# Patient Record
Sex: FEMALE | Race: BLACK OR AFRICAN AMERICAN | NOT HISPANIC OR LATINO
[De-identification: names, ages, dates, MRNs, and addresses within clinical notes are randomized per-mention and may not be internally consistent; named-entity substitution may affect disease eponyms.]

---

## 2017-05-02 ENCOUNTER — RESULT REVIEW (OUTPATIENT)
Age: 32
End: 2017-05-02

## 2018-04-10 ENCOUNTER — EMERGENCY (EMERGENCY)
Facility: HOSPITAL | Age: 33
LOS: 1 days | Discharge: HOME | End: 2018-04-10
Attending: EMERGENCY MEDICINE | Admitting: EMERGENCY MEDICINE
Payer: COMMERCIAL

## 2018-04-10 VITALS
RESPIRATION RATE: 16 BRPM | DIASTOLIC BLOOD PRESSURE: 97 MMHG | OXYGEN SATURATION: 100 % | TEMPERATURE: 98 F | SYSTOLIC BLOOD PRESSURE: 153 MMHG | HEART RATE: 71 BPM

## 2018-04-10 PROCEDURE — 99284 EMERGENCY DEPT VISIT MOD MDM: CPT

## 2018-04-10 RX ORDER — MECLIZINE HCL 12.5 MG
1 TABLET ORAL
Qty: 20 | Refills: 0 | OUTPATIENT
Start: 2018-04-10

## 2018-04-10 NOTE — ED PROVIDER NOTE - PHYSICAL EXAMINATION
Performed Barany test and found pt is no longer dizzy, but states feels generally weak and describes it as "blah"

## 2018-04-10 NOTE — ED PROVIDER NOTE - NSTIMEPROVIDERCAREINITIATE_GEN_ER
MEDICAL ONCOLOGY FOLLOWUP    The electronic health record was reviewed.    The patient was interviewed and evaluated again    Last seen by me 6 mos ago.    HISTORY OF PRESENT ILLNESS:    In summary, the patient is a pleasant 78 year old  female. She remains on adjuvant Anastrozole for H/O breast cancer, Nov 2012 to present. Reference to prior reports.    She RTC in surveillance re-eval.    She reports no new sxs.     And no changes to SBE.     Re H/O osteoporosis, pt has reported non-compliance with Rx Fosamax due to Rx expense. Pt continues Ca and vit D supplementation.     PAST MEDICAL HISTORY, PAST SURGICAL HISTORY, MEDICATIONS, ALLERGIES, FAMILY HISTORY, SOCIAL HISTORY AND REVIEW OF SYSTEMS:  As detailed in the electronic health record with interval updates provided by clinic nursing support staff. Reviewed and agree.    PHYSICAL EXAMINATION:  CONSTITUTIONAL: Alert, cooperative, oriented. Mood and affect appropriate. Appears close to chronological age. Well nourished. Well developed. The patient appearing less fatigued. Estimated performance status of 2.  HEAD: Normocephalic; no scars.  EYES: Conjunctivae and sclerae are clear and without icterus. Pupils are reactive and equal.  ENMT: Sinuses are nontender. No oral exudates, ulcers, masses, thrush or mucositis. Oropharynx clear. Tongue normal.  NECK: Supple without masses or thyromegaly. No jugular venous distension.  HEMATOLOGIC/LYMPHATIC: No petechiae or purpura. No tender or palpable lymph nodes in the cervical, supraclavicular, axillary or inguinal area.  RESPIRATORY: Lungs are clear to auscultation without rhonchi or wheezing.  CARDIOVASCULAR: Regular rate and rhythm of heart without murmurs, gallops or rubs.  CHEST: Chest is symmetric, without chest wall deformities. No palpable breast nor chest wall lesions, and no axillary adenopathy on B manual exam.  ABDOMEN: Non-tender, non-distended, no masses, ascites or hepatosplenomegaly. Good bowel sounds. No  10-Apr-2018 09:02 guarding or rebound tenderness. No pulsatile masses.  BACK/SPINE: No kyphosis, scoliosis, compression fractures. Non-tender to palpation.  MUSCULOSKELETAL: No tenderness or swelling. Normal range of motion without obvious weakness.  EXTREMITIES: No visible deformities. No cyanosis, clubbing or edema. Pulses equal bilaterally.  INTEGUMENTARY: No rashes, scars, or lesions suggestive of malignancy.  NEUROLOGIC: No sensory or motor deficits, normal cerebellar function, cranial nerves intact.  PSYCHIATRIC: Alert and oriented times three. Coherent speech. Verbalizes understanding of our discussions today.    DATA REVIEWED:  Labs and Radiology reviewed.    CBC, CMP reviewed.    Mammo October 2016 reviewed.  IMPRESSION:  No signs of malignancy in either breast. Recommend routine  screening mammogram.    IMPRESSION AND PLAN:  1. Breast cancer. DILCIA. Continue Anastrozole through Nov 2017 to complete plan of 5 years adjuvant therapy. RTC with me in 3-4 mos for re-eval. Interval annual mammo when next due.   2. Osteoporosis. Issues per HPI above. Pt attempting Ca and vit D supplementation and also to continue F/U with Dr. Stark for prior H/O Rx mgmnt with Fosamax.  3. DJD.    Approximately 15 minutes were spent reviewing the patient's records, interviewing and evaluating her, and providing recommendations and care (with assistance of ). Greater than 50% of this time was spent in face-to-face counseling of the patient regarding her oncologic issues, updated exam and next recommendations. Her questions were answered, along with those of her spouse.    Den Otero MD

## 2018-04-10 NOTE — ED PROVIDER NOTE - MEDICAL DECISION MAKING DETAILS
31 y/o F w/ peripheral vertigo since last night that is resolving. Will obtain FS and UCG. If both neg, will give meclizine for sx and note for work

## 2018-04-10 NOTE — ED PROVIDER NOTE - OBJECTIVE STATEMENT
33 y/o F w/ PMHx of HTN (currently on no meds because resolved after pregnancy), presents to the ED c/o room spinning dizziness since last night. Pt notes dizziness occurs every time she closes her eyes and feels like she is falling to the ground. Pt also reports generalized weakness. Denies LOC, pre-syncope, nausea, vomiting, body pain, focal deficits, cough, rhinorrhea, congestion, trauma, similar sx in the past or any other complaints.

## 2018-07-10 ENCOUNTER — EMERGENCY (EMERGENCY)
Facility: HOSPITAL | Age: 33
LOS: 1 days | Discharge: HOME | End: 2018-07-10
Attending: EMERGENCY MEDICINE | Admitting: EMERGENCY MEDICINE
Payer: COMMERCIAL

## 2018-07-10 VITALS
TEMPERATURE: 98 F | SYSTOLIC BLOOD PRESSURE: 156 MMHG | OXYGEN SATURATION: 99 % | RESPIRATION RATE: 17 BRPM | DIASTOLIC BLOOD PRESSURE: 86 MMHG | HEART RATE: 78 BPM

## 2018-07-10 VITALS — WEIGHT: 214.95 LBS

## 2018-07-10 DIAGNOSIS — O00.109 UNSPECIFIED TUBAL PREGNANCY WITHOUT INTRAUTERINE PREGNANCY: ICD-10-CM

## 2018-07-10 LAB
ALBUMIN SERPL ELPH-MCNC: 4.2 G/DL — SIGNIFICANT CHANGE UP (ref 3.3–5)
ALP SERPL-CCNC: 70 U/L — SIGNIFICANT CHANGE UP (ref 40–120)
ALT FLD-CCNC: 10 U/L — SIGNIFICANT CHANGE UP (ref 4–33)
APPEARANCE UR: CLEAR — SIGNIFICANT CHANGE UP
APTT BLD: 30 SEC — SIGNIFICANT CHANGE UP (ref 27.5–37.4)
AST SERPL-CCNC: 16 U/L — SIGNIFICANT CHANGE UP (ref 4–32)
BASOPHILS # BLD AUTO: 0.02 K/UL — SIGNIFICANT CHANGE UP (ref 0–0.2)
BASOPHILS NFR BLD AUTO: 0.3 % — SIGNIFICANT CHANGE UP (ref 0–2)
BILIRUB SERPL-MCNC: 0.2 MG/DL — SIGNIFICANT CHANGE UP (ref 0.2–1.2)
BILIRUB UR-MCNC: NEGATIVE — SIGNIFICANT CHANGE UP
BLD GP AB SCN SERPL QL: NEGATIVE — SIGNIFICANT CHANGE UP
BLOOD UR QL VISUAL: NEGATIVE — SIGNIFICANT CHANGE UP
BUN SERPL-MCNC: 13 MG/DL — SIGNIFICANT CHANGE UP (ref 7–23)
CALCIUM SERPL-MCNC: 9.3 MG/DL — SIGNIFICANT CHANGE UP (ref 8.4–10.5)
CHLORIDE SERPL-SCNC: 101 MMOL/L — SIGNIFICANT CHANGE UP (ref 98–107)
CO2 SERPL-SCNC: 26 MMOL/L — SIGNIFICANT CHANGE UP (ref 22–31)
COLOR SPEC: YELLOW — SIGNIFICANT CHANGE UP
CREAT SERPL-MCNC: 1.18 MG/DL — SIGNIFICANT CHANGE UP (ref 0.5–1.3)
EOSINOPHIL # BLD AUTO: 0.2 K/UL — SIGNIFICANT CHANGE UP (ref 0–0.5)
EOSINOPHIL NFR BLD AUTO: 2.6 % — SIGNIFICANT CHANGE UP (ref 0–6)
GLUCOSE SERPL-MCNC: 93 MG/DL — SIGNIFICANT CHANGE UP (ref 70–99)
GLUCOSE UR-MCNC: NEGATIVE — SIGNIFICANT CHANGE UP
HCG SERPL-ACNC: 1351 MIU/ML — SIGNIFICANT CHANGE UP
HCT VFR BLD CALC: 36.1 % — SIGNIFICANT CHANGE UP (ref 34.5–45)
HGB BLD-MCNC: 12 G/DL — SIGNIFICANT CHANGE UP (ref 11.5–15.5)
IMM GRANULOCYTES # BLD AUTO: 0.02 # — SIGNIFICANT CHANGE UP
IMM GRANULOCYTES NFR BLD AUTO: 0.3 % — SIGNIFICANT CHANGE UP (ref 0–1.5)
INR BLD: 0.92 — SIGNIFICANT CHANGE UP (ref 0.88–1.17)
KETONES UR-MCNC: NEGATIVE — SIGNIFICANT CHANGE UP
LEUKOCYTE ESTERASE UR-ACNC: NEGATIVE — SIGNIFICANT CHANGE UP
LYMPHOCYTES # BLD AUTO: 2.71 K/UL — SIGNIFICANT CHANGE UP (ref 1–3.3)
LYMPHOCYTES # BLD AUTO: 34.7 % — SIGNIFICANT CHANGE UP (ref 13–44)
MCHC RBC-ENTMCNC: 28.9 PG — SIGNIFICANT CHANGE UP (ref 27–34)
MCHC RBC-ENTMCNC: 33.2 % — SIGNIFICANT CHANGE UP (ref 32–36)
MCV RBC AUTO: 87 FL — SIGNIFICANT CHANGE UP (ref 80–100)
MONOCYTES # BLD AUTO: 0.34 K/UL — SIGNIFICANT CHANGE UP (ref 0–0.9)
MONOCYTES NFR BLD AUTO: 4.4 % — SIGNIFICANT CHANGE UP (ref 2–14)
MUCOUS THREADS # UR AUTO: SIGNIFICANT CHANGE UP
NEUTROPHILS # BLD AUTO: 4.51 K/UL — SIGNIFICANT CHANGE UP (ref 1.8–7.4)
NEUTROPHILS NFR BLD AUTO: 57.7 % — SIGNIFICANT CHANGE UP (ref 43–77)
NITRITE UR-MCNC: NEGATIVE — SIGNIFICANT CHANGE UP
NRBC # FLD: 0 — SIGNIFICANT CHANGE UP
PH UR: 5.5 — SIGNIFICANT CHANGE UP (ref 4.6–8)
PLATELET # BLD AUTO: 209 K/UL — SIGNIFICANT CHANGE UP (ref 150–400)
PMV BLD: 9.7 FL — SIGNIFICANT CHANGE UP (ref 7–13)
POTASSIUM SERPL-MCNC: 4 MMOL/L — SIGNIFICANT CHANGE UP (ref 3.5–5.3)
POTASSIUM SERPL-SCNC: 4 MMOL/L — SIGNIFICANT CHANGE UP (ref 3.5–5.3)
PROT SERPL-MCNC: 7.1 G/DL — SIGNIFICANT CHANGE UP (ref 6–8.3)
PROT UR-MCNC: 20 MG/DL — SIGNIFICANT CHANGE UP
PROTHROM AB SERPL-ACNC: 10.6 SEC — SIGNIFICANT CHANGE UP (ref 9.8–13.1)
RBC # BLD: 4.15 M/UL — SIGNIFICANT CHANGE UP (ref 3.8–5.2)
RBC # FLD: 12.1 % — SIGNIFICANT CHANGE UP (ref 10.3–14.5)
RBC CASTS # UR COMP ASSIST: SIGNIFICANT CHANGE UP (ref 0–?)
RH IG SCN BLD-IMP: POSITIVE — SIGNIFICANT CHANGE UP
SODIUM SERPL-SCNC: 140 MMOL/L — SIGNIFICANT CHANGE UP (ref 135–145)
SP GR SPEC: 1.02 — SIGNIFICANT CHANGE UP (ref 1–1.04)
SQUAMOUS # UR AUTO: SIGNIFICANT CHANGE UP
UROBILINOGEN FLD QL: NORMAL MG/DL — SIGNIFICANT CHANGE UP
WBC # BLD: 7.8 K/UL — SIGNIFICANT CHANGE UP (ref 3.8–10.5)
WBC # FLD AUTO: 7.8 K/UL — SIGNIFICANT CHANGE UP (ref 3.8–10.5)
WBC UR QL: SIGNIFICANT CHANGE UP (ref 0–?)

## 2018-07-10 PROCEDURE — 99285 EMERGENCY DEPT VISIT HI MDM: CPT

## 2018-07-10 PROCEDURE — 76830 TRANSVAGINAL US NON-OB: CPT | Mod: 26

## 2018-07-10 RX ORDER — METHOTREXATE 2.5 MG/1
103 TABLET ORAL ONCE
Qty: 0 | Refills: 0 | Status: COMPLETED | OUTPATIENT
Start: 2018-07-10 | End: 2018-07-10

## 2018-07-10 RX ADMIN — METHOTREXATE 103 MILLIGRAM(S): 2.5 TABLET ORAL at 19:03

## 2018-07-10 NOTE — ED PROVIDER NOTE - PROGRESS NOTE DETAILS
JEAN-PIERRE Munguia: Radiology called pt with right adnexal ectopic pregnancy, does not appear to be ruptured, spoke with OBGYN who will see pt. JEAN-PIERRE Munguia: Spoke with OBGYN: pt to get methotrexate and have repeat beta done on day 4 (Friday) and day 7 (Monday). She is to have the labs drawn at the lab affiliated with her OBGYN who saw her in the ED today. She is to see her OB in the office next week, pt agrees to this plan. OB consented patient for methotrexate

## 2018-07-10 NOTE — ED PROVIDER NOTE - MEDICAL DECISION MAKING DETAILS
33yF w/pmhx asthma sent from OB office with concern for ectopic, s/p D&C 2 weeks ago. US this morning showed right ovarian cystic mass. Will repeat US, cbc/cmp, pre-ops. Serum hcg.

## 2018-07-10 NOTE — CONSULT NOTE ADULT - ASSESSMENT
34yo  LMP  2wks postop s/p DVC termination at 6wks GA (), with ultrasound finding of right ovarian mass suspicious for ectopic pregnancy.  Patient is stable and currently asymptomatic    Discussed implications of ectopic pregnancy diagnosis, and recommendations to treat despite lack of definitive diagnosis at this time due to risk of rupture, intra-abdominal bleeding, mortality.

## 2018-07-10 NOTE — ED PROVIDER NOTE - CARE PLAN
Principal Discharge DX:	Ectopic pregnancy, tubal Principal Discharge DX:	Ectopic pregnancy, tubal  Assessment and plan of treatment:	Follow up with your OBGYN next week  Have repeat labs drawn on Friday 7/13 and Monday 7/16 (beta check) at the lab your OB instructed you to use  Return to the emergency department with any worsening or concerning symptoms, pelvic pain, fever/chills, nausea, vomiting or any other concerns.

## 2018-07-10 NOTE — ED PROVIDER NOTE - ATTENDING CONTRIBUTION TO CARE
Pt was seen and evaluated by me. Pt is a 32 y/o female  with PMHx of HTN and recent D&C presenting to the ED for abnormal US. Pt states 2 weeks ago she had a D&C at 6wks. Pt notes she was having similar symptoms to being pregnant with intermittent nausea and tiredness and was being followed up at OB/Gyn today and was found to have an US concerning for an ectopic. Pt denies any fever, chills, vomiting, SOB, chest pain, abd pain, dysuria, vaginal bleeding, or discharge. Lungs CTA b/l. RRR. Abd soft, non-tender.

## 2018-07-10 NOTE — ED PROVIDER NOTE - PLAN OF CARE
Follow up with your OBGYN next week  Have repeat labs drawn on Friday 7/13 and Monday 7/16 (beta check) at the lab your OB instructed you to use  Return to the emergency department with any worsening or concerning symptoms, pelvic pain, fever/chills, nausea, vomiting or any other concerns.

## 2018-07-10 NOTE — CONSULT NOTE ADULT - SUBJECTIVE AND OBJECTIVE BOX
HPI:    33y G_P_, LMP      presents with     SERUM bHCG 1351  OBHx:  GynHx:   PMH:  PSH:  All:  Meds:   SocialHx:     Vital Signs Last 24 Hrs  T(C): 36.7 (10 Jul 2018 13:00), Max: 36.7 (10 Jul 2018 13:00)  T(F): 98 (10 Jul 2018 13:00), Max: 98 (10 Jul 2018 13:00)  HR: 78 (10 Jul 2018 13:00) (78 - 78)  BP: 156/86 (10 Jul 2018 13:00) (156/86 - 156/86)  RR: 17 (10 Jul 2018 13:00) (17 - 17)  SpO2: 99% (10 Jul 2018 13:00) (99% - 99%)    PE:  Gen: Comfortable, NAD  CV: RRR  Pulm: CTAB  Abd: Soft, NT  Ext: No edema or tenderness bilaterally  Spec Exam:    LABS:                        12.0   7.80  )-----------( 209      ( 10 Jul 2018 13:55 )             36.1     07-10    140  |  101  |  13  ----------------------------<  93  4.0   |  26  |  1.18    Ca    9.3      10 Jul 2018 13:55    TPro  7.1  /  Alb  4.2  /  TBili  0.2  /  DBili  x   /  AST  16  /  ALT  10  /  AlkPhos  70  07-10    PT/INR - ( 10 Jul 2018 13:55 )   PT: 10.6 SEC;   INR: 0.92          PTT - ( 10 Jul 2018 13:55 )  PTT:30.0 SEC  Urinalysis Basic - ( 10 Jul 2018 14:00 )    Color: YELLOW / Appearance: CLEAR / S.023 / pH: 5.5  Gluc: NEGATIVE / Ketone: NEGATIVE  / Bili: NEGATIVE / Urobili: NORMAL mg/dL   Blood: NEGATIVE / Protein: 20 mg/dL / Nitrite: NEGATIVE   Leuk Esterase: NEGATIVE / RBC: 0-2 / WBC 0-2   Sq Epi: OCC / Non Sq Epi: x / Bacteria: x        RADIOLOGY & ADDITIONAL STUDIES:  < from: US Transvaginal (07.10.18 @ 15:02) >  FINDINGS:    Uterus: 6.8 x 4 x 5 cm. Within normal limits.    Endometrium: 4 mm. Within normal limits.    Right ovary: 2.3 x 1.2 x 1.1 cm. 2.2 x 1.8 x 2.3 cm mildly hyperechoic   vascular lesion in the right adnexa separate from the ovary.    Left ovary: 2.7 x 2 x 1.6 cm. Within normal limits.    Fluid: Minimal pelvic free fluid.    IMPRESSION:    Suspicious for right adnexal ectopic pregnancy. R3 GYN Consult Note    32yo  LMP  2wks postop s/p DVC termination at 6wks GA (), sent in from [facility] for suspected ectopic pregnancy. Patient reports being referred to [facility] by her GYN, as she desired a termination. She reports ultrasound confirming intrauterine pregnancy prior to the DVC at the clinic. Patient was then asked to follow up next week. She received a call last week asking her to come in urgently, but she was not given a reason. Patient was in [place] on vacation so she scheduled an appointment today instead. Today, ultrasound showed possible R cyst vs ectopic pregnancy and she was sent in.  Patient denies fever, chills, vaginal bleeding, dysuria, lightheadedness or dizziness. She endorses intermittent upper abdominal cramping that occurs late at night, or after meals. She thinks it might be gas, reflux symptoms. She otherwise has no complaints    GYN = Dr. Minaya    [facility] 1351  OBHx:  x3, eTOP x3  GynHx: reg menses, no known f/c/abnl Paps  PMSH: h/o preeclampsia in prior pregnancy, s/p DVC x3  NKDA    Vital Signs Last 24 Hrs  T(C): 36.7 (10 Jul 2018 13:00), Max: 36.7 (10 Jul 2018 13:00)  T(F): 98 (10 Jul 2018 13:00), Max: 98 (10 Jul 2018 13:00)  HR: 78 (10 Jul 2018 13:00) (78 - 78)  BP: 156/86 (10 Jul 2018 13:00) (156/86 - 156/86)  RR: 17 (10 Jul 2018 13:00) (17 - 17)  SpO2: 99% (10 Jul 2018 13:00) (99% - 99%)    PE:  Gen: Comfortable, NAD  CV: RRR  Pulm: CTAB  Abd: soft, nontender, no rebound or guarding  Ext: No edema or tenderness bilaterally  Spec Exam: normal appearing cervix, no bleeding, no discharge  Bimanual: ~6cm anteverted uterus nontender, no CMT, no palpable adnexal masses or tenderness bilaterally    LABS:                        12.0   7.80  )-----------( 209      ( 10 Jul 2018 13:55 )             36.1     07-10    140  |  101  |  13  ----------------------------<  93  4.0   |  26  |  1.18    Ca    9.3      10 Jul 2018 13:55    TPro  7.1  /  Alb  4.2  /  TBili  0.2  /  DBili  x   /  AST  16  /  ALT  10  /  AlkPhos  70  07-10    PT/INR - ( 10 Jul 2018 13:55 )   PT: 10.6 SEC;   INR: 0.92          PTT - ( 10 Jul 2018 13:55 )  PTT:30.0 SEC  Urinalysis Basic - ( 10 Jul 2018 14:00 )    Color: YELLOW / Appearance: CLEAR / S.023 / pH: 5.5  Gluc: NEGATIVE / Ketone: NEGATIVE  / Bili: NEGATIVE / Urobili: NORMAL mg/dL   Blood: NEGATIVE / Protein: 20 mg/dL / Nitrite: NEGATIVE   Leuk Esterase: NEGATIVE / RBC: 0-2 / WBC 0-2   Sq Epi: OCC / Non Sq Epi: x / Bacteria: x        RADIOLOGY & ADDITIONAL STUDIES:  < from: US Transvaginal (07.10.18 @ 15:02) >  FINDINGS:    Uterus: 6.8 x 4 x 5 cm. Within normal limits.    Endometrium: 4 mm. Within normal limits.    Right ovary: 2.3 x 1.2 x 1.1 cm. 2.2 x 1.8 x 2.3 cm mildly hyperechoic   vascular lesion in the right adnexa separate from the ovary.    Left ovary: 2.7 x 2 x 1.6 cm. Within normal limits.    Fluid: Minimal pelvic free fluid.    IMPRESSION:    Suspicious for right adnexal ectopic pregnancy.

## 2018-07-10 NOTE — ED ADULT TRIAGE NOTE - CHIEF COMPLAINT QUOTE
Patient had a termination 2 weeks ago and had went for her follow up and MD saw something in her ovary. Pt sent for evaluation to see if she has an ectopic because she was pregnant with twins.

## 2018-07-10 NOTE — ED ADULT NURSE NOTE - OBJECTIVE STATEMENT
PT had termination ~2 weeks ago, and was a follow-up appointment today.  Pt was sent to ED to R/O ectopic pregnancy.  + right ectopic pregnancy noted on ultrasound in ED.  OBGYN at bedside.

## 2018-07-10 NOTE — CONSULT NOTE ADULT - PROBLEM SELECTOR RECOMMENDATION 9
- pt to receive methotrexate 103mg (50mg/m^2) today  - prescriptions written for serum bHCG level on day 4 (7/13) and day 7 (7/16).   - patient will follow up with [facility] (Dr. Minaya)  - Ectopic precautions reviewed with patient.  Discussed with patient importance of follow up for b-HCG given unknown pregnancy location at this time.  Patient expressed understanding.  All questions and concerns addressed to patient's apparent satisfaction.   - patient to be discharged if remains stable    Pt evaluated with Dr. Minaya  S Blayne, R3

## 2018-07-10 NOTE — CONSULT NOTE ADULT - ATTENDING COMMENTS
Addendum; Advised patient to follow up with serial hcg in the office 7/13/2018 and 7/16/2018 and then weekly until negative.   The patient verbalized understanding. All questions and concern addressed to full satisfaction. Lori

## 2018-07-10 NOTE — ED PROVIDER NOTE - OBJECTIVE STATEMENT
33yF w/pmhx HTN sent from her OB office with concern for ectopic pregnancy. Pt had a D&C performed 2 weeks ago for a 6 week gestation pregnancy, she had a follow up US performed today where the NP saw a right sided ovarian cyst and as pt had a positive pregnancy test today she is concerned for ectopic pregnancy. Pt states she still feels "pregnancy symptoms" of intermittent nausea, vomiting and feeling fatigued. +intermittent lower abdominal cramping. Pt denies fever/chills, abdominal pain at present, diarrhea, cp, shortness of breath, vaginal bleeding, dysuria, recent travel or illness or any other concerns.

## 2018-07-11 LAB
BACTERIA UR CULT: SIGNIFICANT CHANGE UP
SPECIMEN SOURCE: SIGNIFICANT CHANGE UP

## 2018-07-12 RX ORDER — CEPHALEXIN 500 MG
1 CAPSULE ORAL
Qty: 14 | Refills: 0 | OUTPATIENT
Start: 2018-07-12 | End: 2018-07-18

## 2018-07-12 NOTE — ED POST DISCHARGE NOTE - DETAILS
Discussed results with patient.  Pt c/o urinary frequency.  ERX sent to pharmacy for Keflex.  Pt has a follow up appt 7/18/18 with GYN.  Recommended repeat UA/UCX.

## 2018-08-27 ENCOUNTER — RESULT REVIEW (OUTPATIENT)
Age: 33
End: 2018-08-27

## 2018-11-09 ENCOUNTER — EMERGENCY (EMERGENCY)
Facility: HOSPITAL | Age: 33
LOS: 1 days | Discharge: HOME | End: 2018-11-09
Attending: EMERGENCY MEDICINE | Admitting: EMERGENCY MEDICINE
Payer: COMMERCIAL

## 2018-11-09 VITALS
SYSTOLIC BLOOD PRESSURE: 157 MMHG | DIASTOLIC BLOOD PRESSURE: 77 MMHG | OXYGEN SATURATION: 100 % | RESPIRATION RATE: 20 BRPM | TEMPERATURE: 99 F | HEART RATE: 94 BPM

## 2018-11-09 PROCEDURE — 99284 EMERGENCY DEPT VISIT MOD MDM: CPT | Mod: 25

## 2018-11-09 NOTE — ED ADULT TRIAGE NOTE - CHIEF COMPLAINT QUOTE
Pt c/o harsh dry cough, reports having symptoms, saw PCP rx'd Amoxicillin 1wk w/o improvement.  Chest pain w/ coughing. States was having cough w/ yellowish sputum initially but now chest congestion & unable to bring up mucus. Resps unlabored, o2sat 100% on RA.  Appearing in no distress.  EKG obtained, mask applied. Pt c/o harsh dry cough, chest tightness. Saw PCP rx'd Amoxicillin 1wk w/o improvement.  Chest pain w/ cough.  States was producing yellowish sputum initially, now chest congestion & unable to bring up mucus. Resps unlabored, o2sat 100% on RA.  Appearing in no distress.  EKG obtained, mask applied.

## 2018-11-10 VITALS
DIASTOLIC BLOOD PRESSURE: 77 MMHG | SYSTOLIC BLOOD PRESSURE: 142 MMHG | TEMPERATURE: 98 F | OXYGEN SATURATION: 100 % | HEART RATE: 80 BPM | RESPIRATION RATE: 16 BRPM

## 2018-11-10 PROBLEM — I10 ESSENTIAL (PRIMARY) HYPERTENSION: Chronic | Status: ACTIVE | Noted: 2018-04-10

## 2018-11-10 PROCEDURE — 71046 X-RAY EXAM CHEST 2 VIEWS: CPT | Mod: 26

## 2018-11-10 RX ORDER — ALBUTEROL 90 UG/1
1 AEROSOL, METERED ORAL ONCE
Qty: 0 | Refills: 0 | Status: COMPLETED | OUTPATIENT
Start: 2018-11-10 | End: 2018-11-10

## 2018-11-10 RX ADMIN — Medication 100 MILLIGRAM(S): at 04:11

## 2018-11-10 RX ADMIN — ALBUTEROL 1 PUFF(S): 90 AEROSOL, METERED ORAL at 04:11

## 2018-11-10 NOTE — ED PROVIDER NOTE - ATTENDING CONTRIBUTION TO CARE
Dr. Rebollar:  I have personally performed a face to face bedside history and physical examination of this patient. I have discussed the history, examination, review of systems, assessment and plan of management with the resident. I have reviewed the electronic medical record and amended it to reflect my history, review of systems, physical exam, assessment and plan.    33F denies pmh presents with persistent cough x 1.5 weeks.  Was prescribed 7 day course of amoxicillin by her PCP with little improvement.  Tm 101 at home.  ROS otherwise negative, denies sick contacts and recent travel.    Exam:  - nad  - rrr  - ctab   -abd soft ntnd    A/P  - cough, eval PNA  - tessalon perles, albuterol  - CXR

## 2018-11-10 NOTE — ED ADULT NURSE REASSESSMENT NOTE - NS ED NURSE REASSESS COMMENT FT1
returned from break coverage at this time: pt aox4  being d/c at this time.  as per md pt ok to go after clear chest x ray.  given inhaler to be d/c with.  pt verbalized understanding of d/c in no acute distress on leaving.

## 2018-11-10 NOTE — ED PROVIDER NOTE - OBJECTIVE STATEMENT
33F no PMH p/w persistent cough productive of yellowish sputum a/w chest discomfort when coughing x 1.5 weeks. Saw PMD and given 7 day course of amoxicillin without improvement. Also taking OTC cough medication without improvement. No recent travel or sick contacts. Initially had fever (101F) at start of symptoms that has now resolved.

## 2019-05-30 NOTE — ED PROVIDER NOTE - CROS ED RESP ALL NEG
I have faxed this to them.
Spoke with [facility], they need proof that pt had pneumonia shots faxed to them at [phone]
- - -

## 2021-04-15 ENCOUNTER — EMERGENCY (EMERGENCY)
Facility: HOSPITAL | Age: 36
LOS: 1 days | Discharge: HOME | End: 2021-04-15
Admitting: EMERGENCY MEDICINE
Payer: MEDICAID

## 2021-04-15 VITALS
OXYGEN SATURATION: 100 % | RESPIRATION RATE: 18 BRPM | SYSTOLIC BLOOD PRESSURE: 177 MMHG | TEMPERATURE: 98 F | DIASTOLIC BLOOD PRESSURE: 92 MMHG | HEART RATE: 78 BPM

## 2021-04-15 VITALS
HEART RATE: 100 BPM | TEMPERATURE: 98 F | OXYGEN SATURATION: 100 % | HEIGHT: 65 IN | SYSTOLIC BLOOD PRESSURE: 151 MMHG | DIASTOLIC BLOOD PRESSURE: 91 MMHG | RESPIRATION RATE: 18 BRPM

## 2021-04-15 LAB
ALBUMIN SERPL ELPH-MCNC: 4.2 G/DL — SIGNIFICANT CHANGE UP (ref 3.3–5)
ALP SERPL-CCNC: 76 U/L — SIGNIFICANT CHANGE UP (ref 40–120)
ALT FLD-CCNC: 53 U/L — HIGH (ref 4–33)
ANION GAP SERPL CALC-SCNC: 13 MMOL/L — SIGNIFICANT CHANGE UP (ref 7–14)
APPEARANCE UR: CLEAR — SIGNIFICANT CHANGE UP
AST SERPL-CCNC: 62 U/L — HIGH (ref 4–32)
BACTERIA # UR AUTO: NEGATIVE — SIGNIFICANT CHANGE UP
BASOPHILS # BLD AUTO: 0.01 K/UL — SIGNIFICANT CHANGE UP (ref 0–0.2)
BASOPHILS NFR BLD AUTO: 0.2 % — SIGNIFICANT CHANGE UP (ref 0–2)
BILIRUB SERPL-MCNC: 0.3 MG/DL — SIGNIFICANT CHANGE UP (ref 0.2–1.2)
BILIRUB UR-MCNC: NEGATIVE — SIGNIFICANT CHANGE UP
BUN SERPL-MCNC: 12 MG/DL — SIGNIFICANT CHANGE UP (ref 7–23)
CALCIUM SERPL-MCNC: 9.3 MG/DL — SIGNIFICANT CHANGE UP (ref 8.4–10.5)
CHLORIDE SERPL-SCNC: 101 MMOL/L — SIGNIFICANT CHANGE UP (ref 98–107)
CO2 SERPL-SCNC: 27 MMOL/L — SIGNIFICANT CHANGE UP (ref 22–31)
COLOR SPEC: YELLOW — SIGNIFICANT CHANGE UP
CREAT SERPL-MCNC: 0.86 MG/DL — SIGNIFICANT CHANGE UP (ref 0.5–1.3)
DIFF PNL FLD: NEGATIVE — SIGNIFICANT CHANGE UP
EOSINOPHIL # BLD AUTO: 0 K/UL — SIGNIFICANT CHANGE UP (ref 0–0.5)
EOSINOPHIL NFR BLD AUTO: 0 % — SIGNIFICANT CHANGE UP (ref 0–6)
EPI CELLS # UR: 9 /HPF — HIGH (ref 0–5)
GLUCOSE SERPL-MCNC: 96 MG/DL — SIGNIFICANT CHANGE UP (ref 70–99)
GLUCOSE UR QL: NEGATIVE — SIGNIFICANT CHANGE UP
HCT VFR BLD CALC: 42.2 % — SIGNIFICANT CHANGE UP (ref 34.5–45)
HGB BLD-MCNC: 13.7 G/DL — SIGNIFICANT CHANGE UP (ref 11.5–15.5)
HYALINE CASTS # UR AUTO: 21 /LPF — HIGH (ref 0–7)
IANC: 3.14 K/UL — SIGNIFICANT CHANGE UP (ref 1.5–8.5)
IMM GRANULOCYTES NFR BLD AUTO: 0.2 % — SIGNIFICANT CHANGE UP (ref 0–1.5)
KETONES UR-MCNC: ABNORMAL
LEUKOCYTE ESTERASE UR-ACNC: NEGATIVE — SIGNIFICANT CHANGE UP
LIDOCAIN IGE QN: 29 U/L — SIGNIFICANT CHANGE UP (ref 7–60)
LYMPHOCYTES # BLD AUTO: 1.9 K/UL — SIGNIFICANT CHANGE UP (ref 1–3.3)
LYMPHOCYTES # BLD AUTO: 36 % — SIGNIFICANT CHANGE UP (ref 13–44)
MCHC RBC-ENTMCNC: 29 PG — SIGNIFICANT CHANGE UP (ref 27–34)
MCHC RBC-ENTMCNC: 32.5 GM/DL — SIGNIFICANT CHANGE UP (ref 32–36)
MCV RBC AUTO: 89.4 FL — SIGNIFICANT CHANGE UP (ref 80–100)
MONOCYTES # BLD AUTO: 0.22 K/UL — SIGNIFICANT CHANGE UP (ref 0–0.9)
MONOCYTES NFR BLD AUTO: 4.2 % — SIGNIFICANT CHANGE UP (ref 2–14)
NEUTROPHILS # BLD AUTO: 3.14 K/UL — SIGNIFICANT CHANGE UP (ref 1.8–7.4)
NEUTROPHILS NFR BLD AUTO: 59.4 % — SIGNIFICANT CHANGE UP (ref 43–77)
NITRITE UR-MCNC: NEGATIVE — SIGNIFICANT CHANGE UP
NRBC # BLD: 0 /100 WBCS — SIGNIFICANT CHANGE UP
NRBC # FLD: 0 K/UL — SIGNIFICANT CHANGE UP
PH UR: 6.5 — SIGNIFICANT CHANGE UP (ref 5–8)
PLATELET # BLD AUTO: 148 K/UL — LOW (ref 150–400)
POTASSIUM SERPL-MCNC: 3.7 MMOL/L — SIGNIFICANT CHANGE UP (ref 3.5–5.3)
POTASSIUM SERPL-SCNC: 3.7 MMOL/L — SIGNIFICANT CHANGE UP (ref 3.5–5.3)
PROT SERPL-MCNC: 7.8 G/DL — SIGNIFICANT CHANGE UP (ref 6–8.3)
PROT UR-MCNC: ABNORMAL
RBC # BLD: 4.72 M/UL — SIGNIFICANT CHANGE UP (ref 3.8–5.2)
RBC # FLD: 12.3 % — SIGNIFICANT CHANGE UP (ref 10.3–14.5)
RBC CASTS # UR COMP ASSIST: 2 /HPF — SIGNIFICANT CHANGE UP (ref 0–4)
SODIUM SERPL-SCNC: 141 MMOL/L — SIGNIFICANT CHANGE UP (ref 135–145)
SP GR SPEC: 1.03 — HIGH (ref 1.01–1.02)
UROBILINOGEN FLD QL: ABNORMAL
WBC # BLD: 5.28 K/UL — SIGNIFICANT CHANGE UP (ref 3.8–10.5)
WBC # FLD AUTO: 5.28 K/UL — SIGNIFICANT CHANGE UP (ref 3.8–10.5)
WBC UR QL: 10 /HPF — HIGH (ref 0–5)

## 2021-04-15 PROCEDURE — 71045 X-RAY EXAM CHEST 1 VIEW: CPT | Mod: 26

## 2021-04-15 PROCEDURE — 99284 EMERGENCY DEPT VISIT MOD MDM: CPT

## 2021-04-15 RX ORDER — ONDANSETRON 8 MG/1
1 TABLET, FILM COATED ORAL
Qty: 6 | Refills: 0
Start: 2021-04-15 | End: 2021-04-17

## 2021-04-15 RX ORDER — FAMOTIDINE 10 MG/ML
20 INJECTION INTRAVENOUS ONCE
Refills: 0 | Status: COMPLETED | OUTPATIENT
Start: 2021-04-15 | End: 2021-04-15

## 2021-04-15 RX ORDER — FAMOTIDINE 10 MG/ML
1 INJECTION INTRAVENOUS
Qty: 10 | Refills: 0
Start: 2021-04-15 | End: 2021-04-19

## 2021-04-15 RX ORDER — SODIUM CHLORIDE 9 MG/ML
1000 INJECTION INTRAMUSCULAR; INTRAVENOUS; SUBCUTANEOUS ONCE
Refills: 0 | Status: COMPLETED | OUTPATIENT
Start: 2021-04-15 | End: 2021-04-15

## 2021-04-15 RX ORDER — ONDANSETRON 8 MG/1
4 TABLET, FILM COATED ORAL ONCE
Refills: 0 | Status: COMPLETED | OUTPATIENT
Start: 2021-04-15 | End: 2021-04-15

## 2021-04-15 RX ADMIN — SODIUM CHLORIDE 1000 MILLILITER(S): 9 INJECTION INTRAMUSCULAR; INTRAVENOUS; SUBCUTANEOUS at 21:48

## 2021-04-15 RX ADMIN — ONDANSETRON 4 MILLIGRAM(S): 8 TABLET, FILM COATED ORAL at 21:48

## 2021-04-15 RX ADMIN — Medication 30 MILLILITER(S): at 21:47

## 2021-04-15 RX ADMIN — FAMOTIDINE 20 MILLIGRAM(S): 10 INJECTION INTRAVENOUS at 21:47

## 2021-04-15 NOTE — ED ADULT NURSE NOTE - OBJECTIVE STATEMENT
alert oriented no acute distress cooperative c/o low back pain radiating to left leg  left flank pain and epigastric pain  c/o N/V/D abd soft epigastric area tender to touch  no c/o CP dizziness or SOB

## 2021-04-15 NOTE — ED PROVIDER NOTE - NSFOLLOWUPINSTRUCTIONS_ED_ALL_ED_FT
Follow up with your primary care physician in 48-72 hours- bring copies of your results.  Return to the ER for worsening or persistent symptoms, including but not limited to worsening/persistent pain, shortness of breath, fevers, vomiting, lightheadedness, passing out and/or ANY NEW OR CONCERNING SYMPTOMS. If you have issues obtaining follow up, please call: [phone] to obtain a doctor or specialist who takes your insurance in your area.  You may call [phone] to make an appointment with the internal medicine clinic.

## 2021-04-15 NOTE — ED PROVIDER NOTE - CLINICAL SUMMARY MEDICAL DECISION MAKING FREE TEXT BOX
34 y/o female Dx with COVID x 2 weeks ago here with residual symptoms of viral illness. Will check basic labs. urine, UA, urine culture, symptomatic treatment, antiemetic, GI cocktail, IV fluids, and reassess.

## 2021-04-15 NOTE — ED PROVIDER NOTE - OBJECTIVE STATEMENT
34 y/o female with a PMHx of Dx with COVID x 2 weeks ago presents to the ED with c/o generalized weakness, feeling of dehydrated, nausea, and decrease appetite for x 1 week. Pt states COVID symptoms overall have improved, but still having some weakness and decrease appetite. Pt states has not been eating or drinking much therefore feels dehydrated. Pt admits to nausea, but not vomiting. Pt reports epigastric discomfort. Pt denies fevers, chills, chest pain, SOB, dysuria hematuria, HA, or dizziness. Pt also endorsing mild dry cough.

## 2021-04-15 NOTE — ED ADULT NURSE NOTE - CHIEF COMPLAINT QUOTE
Pt states that she has had n/v loss of appetite for the past 10 days, pt states that she tested positive for Covid on Monday.  Pt denies past medical history, denies daily medications

## 2021-04-15 NOTE — ED PROVIDER NOTE - PATIENT PORTAL LINK FT
You can access the FollowMyHealth Patient Portal offered by [facility] by registering at the following website: [email or web address]. By joining [employer]’s FollowMyHealth portal, you will also be able to view your health information using other applications (apps) compatible with our system.

## 2021-04-15 NOTE — ED PROVIDER NOTE - CHPI ED SYMPTOMS NEG
No chest pain, dysuria hematuria, or dizziness/no fever/no headache/no shortness of breath/no chills

## 2021-04-17 LAB
CULTURE RESULTS: SIGNIFICANT CHANGE UP
SPECIMEN SOURCE: SIGNIFICANT CHANGE UP

## 2021-04-18 NOTE — ED POST DISCHARGE NOTE - RESULT SUMMARY
culture grew 3 or more types of organisms  which indicate collection contamination, consider recollection only if clinically indicated. No antibiotic listed in ED provider note or prescription writer at time of discharge. UA: WBC 10 and epithelial cells 9. Patient contact # [phone] patient denies any urinary symptoms. Discussed with patient to follow up with MD for repeat UA/UCX. Discussed with patient need to return to ED if symptoms don't continue to improve or recur or develops any new or worsening symptoms that are of concern.

## 2021-11-10 NOTE — ED PROVIDER NOTE - WR ORDER NAME 1
What Is The Reason For Today's Visit?: Full Body Skin Examination with No Concerns What Is The Reason For Today's Visit? (Being Monitored For X): concerning skin lesions on an annual basis How Severe Are Your Spot(S)?: moderate Xray Chest 1 View- PORTABLE-Urgent